# Patient Record
Sex: MALE | Race: WHITE | NOT HISPANIC OR LATINO | URBAN - NONMETROPOLITAN AREA
[De-identification: names, ages, dates, MRNs, and addresses within clinical notes are randomized per-mention and may not be internally consistent; named-entity substitution may affect disease eponyms.]

---

## 2020-05-13 ENCOUNTER — TELEPHONE (OUTPATIENT)
Dept: URGENT CARE | Facility: CLINIC | Age: 60
End: 2020-05-13

## 2020-05-13 NOTE — TELEPHONE ENCOUNTER
PATIENT RETURNED TO URGENT CARE STATES HE HAS LOST RX. REQUEST RX RESENT TO [facility]. DR MCNEILL DENIED BECAUSE OF CONTROLLED SUBSTANCE. A.WATSON CALLED PT TO EXPLAIN